# Patient Record
Sex: FEMALE | Race: WHITE | Employment: OTHER | URBAN - METROPOLITAN AREA
[De-identification: names, ages, dates, MRNs, and addresses within clinical notes are randomized per-mention and may not be internally consistent; named-entity substitution may affect disease eponyms.]

---

## 2019-04-02 ENCOUNTER — TELEPHONE (OUTPATIENT)
Dept: PULMONOLOGY | Age: 64
End: 2019-04-02

## 2019-04-02 ENCOUNTER — OFFICE VISIT (OUTPATIENT)
Dept: PULMONOLOGY | Age: 64
End: 2019-04-02

## 2019-04-02 VITALS
BODY MASS INDEX: 30.39 KG/M2 | OXYGEN SATURATION: 97 % | SYSTOLIC BLOOD PRESSURE: 98 MMHG | TEMPERATURE: 97.6 F | HEART RATE: 101 BPM | WEIGHT: 178 LBS | RESPIRATION RATE: 20 BRPM | DIASTOLIC BLOOD PRESSURE: 66 MMHG | HEIGHT: 64 IN

## 2019-04-02 DIAGNOSIS — R05.9 COUGH: Primary | ICD-10-CM

## 2019-04-02 PROBLEM — J98.8 CONGESTION OF RESPIRATORY TRACT: Status: ACTIVE | Noted: 2019-04-02

## 2019-04-02 NOTE — TELEPHONE ENCOUNTER
Pt stated she has been having an ongoing cough for 4wks. States she wants to know if the spot Dr. Conley Nageotte found on her vocal cord could be causing the cough. States she coughs when she talks, is very hoarse & SOB even w/ antibiotics. Please advise [phone].

## 2019-04-02 NOTE — TELEPHONE ENCOUNTER
Pt with cough x 4 weeks. Has been tx with antibiotic by PCP about 4-6 weeks ago. Pt has not been seen her in almost 3 yrs. Has appt for next week with Dr. Jm Peralta but wants to be seen asap. Appt given today to evaluate ongoing cough.

## 2019-04-02 NOTE — PATIENT INSTRUCTIONS
Guaifenesin with dextromethorphan (DM) as directed Please call if you do not hear from gastroenterology

## 2019-04-02 NOTE — PROGRESS NOTES
[facility]  Pulmonary, Critical Care, and Sleep Medicine      Chief complaint:  Cough    HPI:    Elif Nguyen    is 70years old returns the office today for follow-up after several years complaining of a cough for the last 3-1/2 months. She says the cough is dry and is worse at night when she lays down. She sleeps with the head of her bed elevated by using a wedge pillow and other pillows. She also says she does not eat before going to bed and takes Protonix. She also notes that she is hoarse. She also relates episodic dyspnea on exertion but denies leg swelling. Sometimes she has substernal pain which is made worse by taking a deep breath. Allergies   Allergen Reactions    Sulfa (Sulfonamide Antibiotics) Unknown (comments)    Cymbalta [Duloxetine] Unknown (comments)    Gabapentin Unknown (comments)    Reserpine Unknown (comments)    Skelaxin [Metaxalone] Unknown (comments)    Zoloft [Sertraline] Unknown (comments)     Current Outpatient Medications   Medication Sig    levothyroxine (SYNTHROID) 50 mcg tablet Take  by mouth Daily (before breakfast).  FLUOXETINE HCL (FLUOXETINE PO) Take  by mouth.  traMADol (ULTRAM) 50 mg tablet Take 50 mg by mouth every six (6) hours as needed for Pain.  busPIRone (BUSPAR) 15 mg tablet Take 15 mg by mouth three (3) times daily.  fluticasone (FLONASE) 50 mcg/actuation nasal spray 2 Sprays by Both Nostrils route once.  bisacodyl (DULCOLAX) 5 mg EC tablet Take 5 mg by mouth daily as needed for Constipation.  meclizine (ANTIVERT) 25 mg tablet Take  by mouth three (3) times daily as needed.  magnesium oxide 250 mg tablet Take 250 mg by mouth daily.  topiramate (TOPAMAX) 25 mg tablet Take  by mouth two (2) times a day.  montelukast (SINGULAIR) 10 mg tablet Take 10 mg by mouth daily.  acetaminophen (TYLENOL) 500 mg tablet Take  by mouth every six (6) hours as needed for Pain.     albuterol-ipratropium (DUO-NEB) 2.5 mg-0.5 mg/3 ml nebu 3 mL by Nebulization route.  pantoprazole (PROTONIX) 40 mg tablet Take 40 mg by mouth daily. No current facility-administered medications for this visit.       Past Medical History:   Diagnosis Date    Allergic rhinitis     Anxiety     Arthritis     Asthma     Bilateral knee pain     Chronic lung disease     Chronic obstructive airway disease (HCC)     Constipation     COPD (chronic obstructive pulmonary disease) (HCC)     Depression     Diverticular disease     Fatigue     Hypothyroidism     Left rotator cuff tear     Migraines     Osteoarthritis     Sinusitis     SOB (shortness of breath)     Vertigo      Past Surgical History:   Procedure Laterality Date    HX APPENDECTOMY      HX CARPAL TUNNEL RELEASE Bilateral     HX CERVICAL FUSION      HX COLONOSCOPY      HX TOTAL ABDOMINAL HYSTERECTOMY       Social History     Socioeconomic History    Marital status:      Spouse name: Not on file    Number of children: Not on file    Years of education: Not on file    Highest education level: Not on file   Occupational History    Not on file   Social Needs    Financial resource strain: Not on file    Food insecurity:     Worry: Not on file     Inability: Not on file    Transportation needs:     Medical: Not on file     Non-medical: Not on file   Tobacco Use    Smoking status: Never Smoker    Smokeless tobacco: Never Used   Substance and Sexual Activity    Alcohol use: Never     Alcohol/week: 0.0 oz     Frequency: Never    Drug use: Never    Sexual activity: Not on file   Lifestyle    Physical activity:     Days per week: Not on file     Minutes per session: Not on file    Stress: Not on file   Relationships    Social connections:     Talks on phone: Not on file     Gets together: Not on file     Attends Zoroastrianism service: Not on file     Active member of club or organization: Not on file     Attends meetings of clubs or organizations: Not on file Relationship status: Not on file    Intimate partner violence:     Fear of current or ex partner: Not on file     Emotionally abused: Not on file     Physically abused: Not on file     Forced sexual activity: Not on file   Other Topics Concern    Not on file   Social History Narrative    Not on file     Family History   Problem Relation Age of Onset    Cancer Mother     Lung Disease Mother     Arthritis-osteo Mother     Cancer Father     Lung Disease Father     Diabetes Father     Hypertension Father     Heart Disease Father     Arthritis-osteo Father        Review of systems:  She denies fever chills poor appetite or weight loss    Physical Exam:  Visit Vitals  BP 98/66 (BP 1 Location: Left arm, BP Patient Position: Sitting)   Pulse (!) 101   Temp 97.6 °F (36.4 °C) (Oral)   Resp 20   Ht 5' 4\" (1.626 m)   Wt 80.7 kg (178 lb)   SpO2 97%   BMI 30.55 kg/m²       Well-developed well-nourished  HEENT: WNL  Lymph node exam: Supraclavicular cervical lymph nodes negative  Chest: Equal symmetrical expansion no dullness no wheezes rales rubs  Heart: Regular rhythm no gallop no murmur no edema  Extremities: No cyanosis clubbing or calf tenderness  Neurological: Alert and oriented    Labs:    O2 sat 97% room air  Chest x-ray 4/2/19: Personally reviewed, normal lung parenchyma and normal cardiac silhouette appearance no other abnormalities noted    Impression:     Cough in a patient with no fever purulent mucus production or any mucus production making infection unlikely and a history of   Normal spirometry several years ago and no wheezing making asthma unlikely etiology.   Also normal chest x-ray because she has gastroesophageal reflux this is still a consideration especially in a patient who is been hoarse for several months   Plan:   Evaluation with gastroenterology concerning reflux causing cough  As needed guaifenesin with DM  Follow-up in 4-6 weeks    Levon Lowe MD , [facility]    CC: Giancarlo Marie NP [address] [facility], [address] [address]     P: [phone]     F: [phone]

## 2019-04-02 NOTE — PROGRESS NOTES
Chief Complaint   Patient presents with    Breathing Problem    Cough     1. Have you been to the ER, urgent care clinic since your last visit? Hospitalized since your last visit? No    2. Have you seen or consulted any other health care providers outside of the [address] [place] [address] since your last visit? Include any pap smears or colon screening. Yes Where: Dr Alexandra Zelaya PCP, Dr Kerry Persaud Pyschology, Rheumatology    [facility]  [address] [facility]. [address], [address]  [facility]  () [phone]      Simple walk test done in office today. Qualifying O2 sats:     O2 Sat at rest on room air is: 98 %, Pulse 104, SOB scale 7    Walked: 29   m on Room Air : 97 %, Pulse 115, SOB scale 7    O2 Sat at rest on room air O2 is : 97 %, Pulse 109, SOB scale 7    Tech comments regarding testing: patient unable to complete simple walk test. Patient c/o moderate to severe shortness of breath 7/10, severe cough attacks and difficulty walking d/t leg pain. Patient's oxygen remained at 97%.

## 2019-04-08 ENCOUNTER — TELEPHONE (OUTPATIENT)
Dept: PULMONOLOGY | Age: 64
End: 2019-04-08

## 2019-04-08 NOTE — TELEPHONE ENCOUNTER
Pt calling to see if there is any difference in her taking the liquid Mucinex vs pills. Liquid cheaper per pt.  Advised to pt either is fine

## 2019-04-08 NOTE — TELEPHONE ENCOUNTER
Pt stated Dr. Aylin Montes advised at last visit to take mucinex dm for cough and hoarseness. States she's been taking the pill version for 1wk and feels better, but not completely. Wanting to know if there's a difference between pill & liquid and how long he advises her to continue taking medication. Please advise [phone].

## 2019-05-21 ENCOUNTER — OFFICE VISIT (OUTPATIENT)
Dept: PULMONOLOGY | Age: 64
End: 2019-05-21

## 2019-05-21 VITALS
WEIGHT: 175 LBS | HEIGHT: 64 IN | HEART RATE: 85 BPM | BODY MASS INDEX: 29.88 KG/M2 | OXYGEN SATURATION: 98 % | SYSTOLIC BLOOD PRESSURE: 110 MMHG | RESPIRATION RATE: 20 BRPM | TEMPERATURE: 97.6 F | DIASTOLIC BLOOD PRESSURE: 70 MMHG

## 2019-05-21 DIAGNOSIS — R05.9 COUGH: Primary | ICD-10-CM

## 2019-05-21 NOTE — PROGRESS NOTES
Elif Nguyen presents today for   Chief Complaint   Patient presents with    Breathing Problem    COPD    Cough    Shortness of Breath       Is someone accompanying this pt? No     Is the patient using any DME equipment during OV? No     -DME Company n/a    Depression Screening:  3 most recent PHQ Screens 5/21/2019   Little interest or pleasure in doing things Not at all   Feeling down, depressed, irritable, or hopeless Not at all   Total Score PHQ 2 0       Learning Assessment:  Learning Assessment 6/3/2016   PRIMARY LEARNER Patient   PRIMARY LANGUAGE ENGLISH   LEARNER PREFERENCE PRIMARY DEMONSTRATION     LISTENING     READING     VIDEOS     PICTURES   ANSWERED BY Patient   RELATIONSHIP SELF       Abuse Screening:  No flowsheet data found. Fall Risk  Fall Risk Assessment, last 12 mths 5/21/2019   Able to walk? Yes   Fall in past 12 months? Yes   Fall with injury? No   Number of falls in past 12 months 5   Fall Risk Score 5         Coordination of Care:  1. Have you been to the ER, urgent care clinic since your last visit? Hospitalized since your last visit? Yes; Name: [facility]    2. Have you seen or consulted any other health care providers outside of the [address] [place] [address] since your last visit? Include any pap smears or colon screening.  Colonoscopy Dr John Castillo

## 2019-05-21 NOTE — PATIENT INSTRUCTIONS
Continue sleeping with the head of your bed elevated no eating for 2 to 3 hours before bedtime. If this is not helpful consider further evaluation with gastro enterology to prevent aspiration from reflux

## 2019-05-21 NOTE — PROGRESS NOTES
[facility]  Pulmonary, Critical Care, and Sleep Medicine      Chief complaint:  Cough    HPI:    Otto Santillan    is 70years old and comes to the office today because of a chronic cough relates that she also has hoarseness of time that the mucus is frequently clear or cough is nonproductive. Also reports chest pain and a feeling that she cannot breathe when she is eating. She also has dyspnea on exertion at times      Allergies   Allergen Reactions    Sulfa (Sulfonamide Antibiotics) Unknown (comments)    Cymbalta [Duloxetine] Unknown (comments)    Gabapentin Unknown (comments)    Reserpine Unknown (comments)    Skelaxin [Metaxalone] Unknown (comments)    Zoloft [Sertraline] Unknown (comments)     Current Outpatient Medications   Medication Sig    levothyroxine (SYNTHROID) 50 mcg tablet Take  by mouth Daily (before breakfast).  pantoprazole (PROTONIX) 40 mg tablet Take 40 mg by mouth daily.  FLUOXETINE HCL (FLUOXETINE PO) Take  by mouth.  traMADol (ULTRAM) 50 mg tablet Take 50 mg by mouth every six (6) hours as needed for Pain.  busPIRone (BUSPAR) 15 mg tablet Take 15 mg by mouth three (3) times daily.  fluticasone (FLONASE) 50 mcg/actuation nasal spray 2 Sprays by Both Nostrils route once.  bisacodyl (DULCOLAX) 5 mg EC tablet Take 5 mg by mouth daily as needed for Constipation.  meclizine (ANTIVERT) 25 mg tablet Take  by mouth three (3) times daily as needed.  magnesium oxide 250 mg tablet Take 250 mg by mouth daily.  topiramate (TOPAMAX) 25 mg tablet Take  by mouth two (2) times a day.  montelukast (SINGULAIR) 10 mg tablet Take 10 mg by mouth daily.  acetaminophen (TYLENOL) 500 mg tablet Take  by mouth every six (6) hours as needed for Pain.  albuterol-ipratropium (DUO-NEB) 2.5 mg-0.5 mg/3 ml nebu 3 mL by Nebulization route. No current facility-administered medications for this visit.       Past Medical History:   Diagnosis Date    Allergic rhinitis     Anxiety     Arthritis     Asthma     Bilateral knee pain     Chronic lung disease     Chronic obstructive airway disease (HCC)     Constipation     COPD (chronic obstructive pulmonary disease) (HCC)     Depression     Diverticular disease     Fatigue     GERD (gastroesophageal reflux disease)     Hypothyroidism     Left rotator cuff tear     Migraines     Osteoarthritis     Sinusitis     SOB (shortness of breath)     Vertigo      Past Surgical History:   Procedure Laterality Date    HX APPENDECTOMY      HX CARPAL TUNNEL RELEASE Bilateral     HX CERVICAL FUSION      HX COLONOSCOPY      HX COLONOSCOPY      HX TOTAL ABDOMINAL HYSTERECTOMY       Social History     Socioeconomic History    Marital status:      Spouse name: Not on file    Number of children: Not on file    Years of education: Not on file    Highest education level: Not on file   Occupational History    Not on file   Social Needs    Financial resource strain: Not on file    Food insecurity:     Worry: Not on file     Inability: Not on file    Transportation needs:     Medical: Not on file     Non-medical: Not on file   Tobacco Use    Smoking status: Never Smoker    Smokeless tobacco: Never Used   Substance and Sexual Activity    Alcohol use: Never     Alcohol/week: 0.0 oz     Frequency: Never    Drug use: Never    Sexual activity: Not on file   Lifestyle    Physical activity:     Days per week: Not on file     Minutes per session: Not on file    Stress: Not on file   Relationships    Social connections:     Talks on phone: Not on file     Gets together: Not on file     Attends Nondenominational service: Not on file     Active member of club or organization: Not on file     Attends meetings of clubs or organizations: Not on file     Relationship status: Not on file    Intimate partner violence:     Fear of current or ex partner: Not on file     Emotionally abused: Not on file     Physically abused: Not on file     Forced sexual activity: Not on file   Other Topics Concern    Not on file   Social History Narrative    Not on file     Family History   Problem Relation Age of Onset    Cancer Mother     Lung Disease Mother     Arthritis-osteo Mother     Cancer Father     Lung Disease Father     Diabetes Father     Hypertension Father     Heart Disease Father     Arthritis-osteo Father        Review of systems:  She denies fever chills despite having these feelings with eating being suffocated she is gaining weight she reports    Physical Exam:  Visit Vitals  /70 (BP 1 Location: Left arm, BP Patient Position: Sitting)   Pulse 85   Temp 97.6 °F (36.4 °C) (Oral)   Resp 20   Ht 5' 4\" (1.626 m)   Wt 79.4 kg (175 lb)   SpO2 98%   BMI 30.04 kg/m²       Well-developed well-nourished  HEENT: WNL  Lymph node exam: Supraclavicular cervical lymph nodes negative  Chest: Symmetrical expansion no dullness no wheezes rales   heart: Regular rhythm no gallop no murmur no JVD no peripheral  Extremities: No cyanosis or clubbing  Neurological: Alert and oriented  Labs:    O2 sat room air at rest 98%    Impression:     Chronic cough in a patient with a normal chest x-ray spirometry and some unusual symptoms such as smothering when she eats but no dysphasia. She is seeing gastroenterology who feels she is having reflux symptoms    Plan:     Continue efforts to prevent reflux follow-up with gastroenterology follow-up in 6 months otherwise    Charma Prader, MD , [facility]    CC: Kali Li NP     [address] [facility], [address]     P: [phone]     F: [phone]

## 2021-07-29 ENCOUNTER — TRANSCRIBE ORDER (OUTPATIENT)
Dept: SCHEDULING | Age: 66
End: 2021-07-29

## 2021-07-29 DIAGNOSIS — R51.9 NEW ONSET OF HEADACHES: Primary | ICD-10-CM

## 2021-08-11 ENCOUNTER — HOSPITAL ENCOUNTER (OUTPATIENT)
Dept: CT IMAGING | Age: 66
Discharge: HOME | End: 2021-08-11
Payer: MEDICARE

## 2021-08-11 ENCOUNTER — TRANSCRIBE ORDER (OUTPATIENT)
Dept: REGISTRATION | Age: 66
End: 2021-08-11

## 2021-08-11 ENCOUNTER — HOSPITAL ENCOUNTER (OUTPATIENT)
Dept: LAB | Age: 66
Discharge: HOME | End: 2021-08-11
Payer: MEDICARE

## 2021-08-11 DIAGNOSIS — R51.9 HEAD ACHE: ICD-10-CM

## 2021-08-11 DIAGNOSIS — R51.9 HEAD ACHE: Primary | ICD-10-CM

## 2021-08-11 DIAGNOSIS — R51.9 NEW ONSET OF HEADACHES: ICD-10-CM

## 2021-08-11 PROCEDURE — 70470 CT HEAD/BRAIN W/O & W/DYE: CPT

## 2021-08-11 PROCEDURE — 74011000636 HC RX REV CODE- 636

## 2021-08-11 RX ORDER — SODIUM CHLORIDE 0.9 % (FLUSH) 0.9 %
5-10 SYRINGE (ML) INJECTION
Status: COMPLETED | OUTPATIENT
Start: 2021-08-11 | End: 2021-08-11

## 2021-08-11 RX ADMIN — IOPAMIDOL 100 ML: 755 INJECTION, SOLUTION INTRAVENOUS at 10:50

## 2021-08-11 RX ADMIN — Medication 10 ML: at 10:46

## 2022-03-19 PROBLEM — R05.9 COUGH: Status: ACTIVE | Noted: 2019-04-02

## 2022-03-20 PROBLEM — J98.8 CONGESTION OF RESPIRATORY TRACT: Status: ACTIVE | Noted: 2019-04-02

## 2022-06-24 ENCOUNTER — OFFICE VISIT (OUTPATIENT)
Dept: ORTHOPEDIC SURGERY | Age: 67
End: 2022-06-24
Payer: MEDICARE

## 2022-06-24 VITALS — BODY MASS INDEX: 29.23 KG/M2 | HEIGHT: 63 IN | WEIGHT: 165 LBS

## 2022-06-24 DIAGNOSIS — M25.521 RIGHT ELBOW PAIN: Primary | ICD-10-CM

## 2022-06-24 DIAGNOSIS — M77.11 RIGHT LATERAL EPICONDYLITIS: ICD-10-CM

## 2022-06-24 PROCEDURE — 20606 DRAIN/INJ JOINT/BURSA W/US: CPT | Performed by: ORTHOPAEDIC SURGERY

## 2022-06-24 PROCEDURE — 1090F PRES/ABSN URINE INCON ASSESS: CPT | Performed by: ORTHOPAEDIC SURGERY

## 2022-06-24 PROCEDURE — G8427 DOCREV CUR MEDS BY ELIG CLIN: HCPCS | Performed by: ORTHOPAEDIC SURGERY

## 2022-06-24 PROCEDURE — 96372 THER/PROPH/DIAG INJ SC/IM: CPT | Performed by: ORTHOPAEDIC SURGERY

## 2022-06-24 PROCEDURE — 3017F COLORECTAL CA SCREEN DOC REV: CPT | Performed by: ORTHOPAEDIC SURGERY

## 2022-06-24 PROCEDURE — 1123F ACP DISCUSS/DSCN MKR DOCD: CPT | Performed by: ORTHOPAEDIC SURGERY

## 2022-06-24 PROCEDURE — G8536 NO DOC ELDER MAL SCRN: HCPCS | Performed by: ORTHOPAEDIC SURGERY

## 2022-06-24 PROCEDURE — 1101F PT FALLS ASSESS-DOCD LE1/YR: CPT | Performed by: ORTHOPAEDIC SURGERY

## 2022-06-24 PROCEDURE — G8400 PT W/DXA NO RESULTS DOC: HCPCS | Performed by: ORTHOPAEDIC SURGERY

## 2022-06-24 PROCEDURE — G8432 DEP SCR NOT DOC, RNG: HCPCS | Performed by: ORTHOPAEDIC SURGERY

## 2022-06-24 PROCEDURE — 99203 OFFICE O/P NEW LOW 30 MIN: CPT | Performed by: ORTHOPAEDIC SURGERY

## 2022-06-24 PROCEDURE — G8417 CALC BMI ABV UP PARAM F/U: HCPCS | Performed by: ORTHOPAEDIC SURGERY

## 2022-06-24 RX ORDER — LIDOCAINE HYDROCHLORIDE 10 MG/ML
1 INJECTION INFILTRATION; PERINEURAL ONCE
Status: COMPLETED | OUTPATIENT
Start: 2022-06-24 | End: 2022-06-24

## 2022-06-24 RX ORDER — TRIAMCINOLONE ACETONIDE 40 MG/ML
40 INJECTION, SUSPENSION INTRA-ARTICULAR; INTRAMUSCULAR ONCE
Status: COMPLETED | OUTPATIENT
Start: 2022-06-24 | End: 2022-06-24

## 2022-06-24 RX ORDER — ALPRAZOLAM 0.25 MG/1
TABLET ORAL
COMMUNITY
Start: 2022-06-08

## 2022-06-24 RX ORDER — EZETIMIBE 10 MG/1
TABLET ORAL
COMMUNITY
Start: 2022-05-13

## 2022-06-24 RX ADMIN — TRIAMCINOLONE ACETONIDE 40 MG: 40 INJECTION, SUSPENSION INTRA-ARTICULAR; INTRAMUSCULAR at 16:00

## 2022-06-24 RX ADMIN — LIDOCAINE HYDROCHLORIDE 1 ML: 10 INJECTION INFILTRATION; PERINEURAL at 16:00

## 2022-06-24 NOTE — LETTER
Chandrika Singeri   1955   [id number]       6/24/2022       I hereby authorize and direct Jaden Fernandez MD, Tucker Betancourt, and whomever he may designate as his associate to perform upon myself the following procedure:    Injection of: Kenalog, rt elbow/aspirate    If any unforeseen condition arises in the course of the procedure, I further authorize him and his associated and/or assistant(s) to do whatever he/she deems advisable. The nature, purpose, benefits, risks, side effects, likelihood of achieving goals, and potential problems that might occur during recuperation, risks for not receiving the proposed care, treatment and services and alternatives of the procedure have been fully explained to me by my physician including, but not limited to:    Swelling, joint pain, skin pigment changes, worsening of condition, and failure to improve. I acknowledge that no guarantee or assurance has been made to me as to the results that may be obtained or the likelihood of success.                 _______________________________________     Signature of patient or authorized representative                [employer] [facility] fax: [phone]

## 2022-06-24 NOTE — LETTER
6/27/2022    Patient: Jeffrey Moss   YOB: 1955   Date of Visit: 6/24/2022     Brook Adhikari NP  [facility] [address]  Via Fax: [phone]    Dear Brook Adhikari NP,      Thank you for referring Ms. Chandrika Flannery to [address] [place] [address] for evaluation. My notes for this consultation are attached. If you have questions, please do not hesitate to call me. I look forward to following your patient along with you.       Sincerely,    Joanna Bosch MD

## 2022-06-24 NOTE — PROGRESS NOTES
Name: Jeffrey Moss    : 1955     Service Dept: [address] [place] [address]    Chief Complaint   Patient presents with    Elbow Pain        Visit Vitals  Ht 5' 3\" (1.6 m)   Wt 165 lb (74.8 kg)   BMI 29.23 kg/m²        Allergies   Allergen Reactions    Sulfa (Sulfonamide Antibiotics) Unknown (comments)    Cymbalta [Duloxetine] Unknown (comments)    Gabapentin Unknown (comments)    Reserpine Unknown (comments)    Skelaxin [Metaxalone] Unknown (comments)    Zoloft [Sertraline] Unknown (comments)        Current Outpatient Medications   Medication Sig Dispense Refill    ALPRAZolam (XANAX) 0.25 mg tablet       ezetimibe (ZETIA) 10 mg tablet       levothyroxine (SYNTHROID) 50 mcg tablet Take  by mouth Daily (before breakfast).  pantoprazole (PROTONIX) 40 mg tablet Take 40 mg by mouth daily.  FLUOXETINE HCL (FLUOXETINE PO) Take  by mouth.  traMADol (ULTRAM) 50 mg tablet Take 50 mg by mouth every six (6) hours as needed for Pain.  busPIRone (BUSPAR) 15 mg tablet Take 15 mg by mouth three (3) times daily.  fluticasone (FLONASE) 50 mcg/actuation nasal spray 2 Sprays by Both Nostrils route once.  bisacodyl (DULCOLAX) 5 mg EC tablet Take 5 mg by mouth daily as needed for Constipation.  meclizine (ANTIVERT) 25 mg tablet Take  by mouth three (3) times daily as needed.  magnesium oxide 250 mg tablet Take 250 mg by mouth daily.  topiramate (TOPAMAX) 25 mg tablet Take  by mouth two (2) times a day.  montelukast (SINGULAIR) 10 mg tablet Take 10 mg by mouth daily.  acetaminophen (TYLENOL) 500 mg tablet Take  by mouth every six (6) hours as needed for Pain.  albuterol-ipratropium (DUO-NEB) 2.5 mg-0.5 mg/3 ml nebu 3 mL by Nebulization route.        Current Facility-Administered Medications   Medication Dose Route Frequency Provider Last Rate Last Admin    [COMPLETED] triamcinolone acetonide (KENALOG-40) 40 mg/mL injection 40 mg  40 mg Other ONCE Jaden Power MD   40 mg at 06/24/22 1600    [COMPLETED] lidocaine (XYLOCAINE) 10 mg/mL (1 %) injection 1 mL  1 mL Other ONCE Jaden Del Toro MD   1 mL at 06/24/22 1600      Patient Active Problem List   Diagnosis Code    Cough R05.9    Congestion of respiratory tract J98.8      Family History   Problem Relation Age of Onset    Cancer Mother     Lung Disease Mother     OSTEOARTHRITIS Mother     Cancer Father     Lung Disease Father     Diabetes Father     Hypertension Father     Heart Disease Father     OSTEOARTHRITIS Father       Social History     Socioeconomic History    Marital status:    Tobacco Use    Smoking status: Never Smoker    Smokeless tobacco: Never Used   Substance and Sexual Activity    Alcohol use: Never     Alcohol/week: 0.0 standard drinks    Drug use: Never      Past Surgical History:   Procedure Laterality Date    HX APPENDECTOMY      HX CARPAL TUNNEL RELEASE Bilateral     HX CERVICAL FUSION      HX COLONOSCOPY      HX COLONOSCOPY      HX TOTAL ABDOMINAL HYSTERECTOMY        Past Medical History:   Diagnosis Date    Allergic rhinitis     Anxiety     Arthritis     Asthma     Bilateral knee pain     Chronic lung disease     Chronic obstructive airway disease (HCC)     Constipation     COPD (chronic obstructive pulmonary disease) (HCC)     Depression     Diverticular disease     Fatigue     GERD (gastroesophageal reflux disease)     Hypothyroidism     Left rotator cuff tear     Migraines     Osteoarthritis     Sinusitis     SOB (shortness of breath)     Vertigo         I have reviewed and agree with PFSH and ROS and intake form in chart and the record furthermore I have reviewed prior medical record(s) regarding this patients care during this appointment.      Review of Systems:   Patient is a pleasant appearing individual, appropriately dressed, well hydrated, well nourished, who is alert, appropriately oriented for age, and in no acute distress with a normal gait and normal affect who does not appear to be in any significant pain. Physical Exam:  Left Elbow- Full Range of motion, Grossly neurovascularly intact, No instabililty, Positive extension test with weakness, Mild swelling, Point tenderness on the lateral epicondyle, No crepitation, No skin rashes or lesions identified. Right Elbow- Full Range of motion, No point tenderness, No instability, Normal Strength, No skin lesions, No swelling, Grossly neurovascularly intact. Procedure Documentation:    I discussed in detail the risks, benefits and complications of an injection which included but are not limited to infection, skin reactions, hot swollen joint, and anaphylaxis with the patient. The patient verbalized understanding and gave informed consent for the injection. The patient's left elbow was prepped using sterile alcohol solution. A sterile needle was inserted into the left elbow and the mixture of 1 mL Lidocaine 1%, 1 mL Kenalog 40 mg was injected under sterile technique. The needle was withdrawn and the puncture site sealed with a Band-Aid. Technique: Under sterile conditions a Neuro Hero ultrasound unit with a variable frequency (7.0-14.0 MHz) linear transducer was used to localize the placement of needle into the left elbow joint. Findings: Successful needle placement for elbow injection. Final images were taken and saved for permanent record. The patient tolerated the injection well. The patient was instructed to call the office immediately if there is any pain, redness, warmth, fever, or chills. Encounter Diagnoses     ICD-10-CM ICD-9-CM   1. Right elbow pain  M25.521 719.42   2. Right lateral epicondylitis  M77.11 726.32       HPI:  The patient is here with a chief complaint of right elbow pain, throbbing, burning pain. It has been the same. Pain is 6/10. X-rays of the right elbow are unremarkable. Assessment/Plan:  1.   Right elbow olecranon bursitis. Plan will be for aspiration and injection to the right elbow. We will see the patient back in 4 weeks and go from there. If the patient gets worse, she is to give me a call. As part of continued conservative pain management options the patient was advised to utilize Tylenol or OTC NSAIDS as long as it is not medically contraindicated. Return to Office: Follow-up and Dispositions    · Return in about 4 weeks (around 7/22/2022) for toya. Administrations This Visit     lidocaine (XYLOCAINE) 10 mg/mL (1 %) injection 1 mL     Admin Date  06/24/2022 Action  Given Dose  1 mL Route  Other Administered By  Deonte Ayala LPN          triamcinolone acetonide (KENALOG-40) 40 mg/mL injection 40 mg     Admin Date  06/24/2022 Action  Given Dose  40 mg Route  Other Administered By  Deonte Ayala LPN               Scribed by Hayden Hutson LPN as dictated by [facility] ZAYRA Alvarez MD.  Documentation True and Accepted Jaden ZAYRA Alvarez MD

## 2022-06-24 NOTE — PATIENT INSTRUCTIONS
Tennis Elbow: Care Instructions  Overview     Tennis elbow is soreness or pain on the outer part of the elbow. The pain occurs when the tendon is stretched and becomes irritated by repeated twisting of the hand, wrist, and forearm. A tendon is a tough tissue that connects muscle to bone. This injury is common in tennis players. But you also can get it from many activities that work the same muscles. Examples include gardening, painting, and using tools. Tennis elbow usually heals with rest and treatment at home. Follow-up care is a key part of your treatment and safety. Be sure to make and go to all appointments, and call your doctor if you are having problems. It's also a good idea to know your test results and keep a list of the medicines you take. How can you care for yourself at home?    · Rest your fingers, wrist, and forearm. Try to stop or reduce any activity that causes elbow pain. You may have to rest your arm for weeks to months. Follow your doctor's directions for how long to rest.     · Put ice or a cold pack on your elbow for 10 to 20 minutes at a time. Try to do this every 1 to 2 hours for the next 3 days (when you are awake) or until the swelling goes down. Put a thin cloth between the ice and your skin. You can try heat, or alternating heat and ice, after the first 3 days.     · If your doctor gave you a brace or splint, use it as directed. A \"counterforce\" brace is a strap around your forearm, just below your elbow. It may ease the pressure on the tendon and spread force throughout your arm.     · Prop up your elbow on pillows to help reduce swelling.     · Follow your doctor's or physical therapist's directions for exercise.     · Return to your usual activities slowly.     · Try to prevent the problem. Learn the best techniques for your sport. For example, make sure the  on your tennis racquet is not too big for your hand.  Try not to hit a tennis ball late in your swing.     · If you work, consider asking your employer about new ways of doing your job if your elbow pain is caused by something you do at work. Medicines    · Be safe with medicines. Read and follow all instructions on the label. ? If the doctor gave you a prescription medicine for pain, take it as prescribed. ? If you are not taking a prescription pain medicine, ask your doctor if you can take an over-the-counter medicine. When should you call for help? Call your doctor now or seek immediate medical care if:    · Your pain is worse.     · You cannot bend your elbow normally.     · Your arm or hand is cool or pale or changes color.     · You have tingling, weakness, or numbness in your hand and fingers. Watch closely for changes in your health, and be sure to contact your doctor if:    · You have work problems caused by your elbow pain.     · Your pain is not better after 2 weeks. Where can you learn more? Go to http://www.gray.com/  Enter C285 in the search box to learn more about \"Tennis Elbow: Care Instructions. \"  Current as of: July 1, 2021               Content Version: 13.2  © 3277-0736 [employer]. Care instructions adapted under license by [employer] (which disclaims liability or warranty for this information). If you have questions about a medical condition or this instruction, always ask your healthcare professional. [address] any warranty or liability for your use of this information.

## 2022-06-27 RX ORDER — OMEPRAZOLE 40 MG/1
CAPSULE, DELAYED RELEASE ORAL
COMMUNITY
Start: 2022-06-26

## 2022-09-12 ENCOUNTER — TELEPHONE (OUTPATIENT)
Dept: GASTROENTEROLOGY | Age: 67
End: 2022-09-12

## 2022-09-12 NOTE — TELEPHONE ENCOUNTER
Patient called asking about referral. I informed her that Dr. Cody Green has declined her referral due to her issue being more surgical related. I advised her to contact her PCP to see if she has any suggestions for sending another referral somewhere else.

## 2022-10-06 ENCOUNTER — OFFICE VISIT (OUTPATIENT)
Dept: GASTROENTEROLOGY | Age: 67
End: 2022-10-06
Payer: MEDICARE

## 2022-10-06 VITALS
SYSTOLIC BLOOD PRESSURE: 129 MMHG | DIASTOLIC BLOOD PRESSURE: 78 MMHG | WEIGHT: 164 LBS | BODY MASS INDEX: 29.05 KG/M2 | HEART RATE: 67 BPM

## 2022-10-06 DIAGNOSIS — M25.78 DEGENERATION OF INTERVERTEBRAL DISC OF CERVICAL REGION WITH OSTEOPHYTE OF CERVICAL VERTEBRA: ICD-10-CM

## 2022-10-06 DIAGNOSIS — F41.9 ANXIETY: ICD-10-CM

## 2022-10-06 DIAGNOSIS — M50.30 DEGENERATION OF INTERVERTEBRAL DISC OF CERVICAL REGION WITH OSTEOPHYTE OF CERVICAL VERTEBRA: ICD-10-CM

## 2022-10-06 DIAGNOSIS — R13.13 PHARYNGEAL DYSPHAGIA: Primary | ICD-10-CM

## 2022-10-06 DIAGNOSIS — F32.A DEPRESSION, UNSPECIFIED DEPRESSION TYPE: ICD-10-CM

## 2022-10-06 DIAGNOSIS — K21.00 GASTROESOPHAGEAL REFLUX DISEASE WITH ESOPHAGITIS WITHOUT HEMORRHAGE: ICD-10-CM

## 2022-10-06 DIAGNOSIS — Z86.010 HISTORY OF COLON POLYPS: ICD-10-CM

## 2022-10-06 PROCEDURE — 3017F COLORECTAL CA SCREEN DOC REV: CPT | Performed by: INTERNAL MEDICINE

## 2022-10-06 PROCEDURE — 1090F PRES/ABSN URINE INCON ASSESS: CPT | Performed by: INTERNAL MEDICINE

## 2022-10-06 PROCEDURE — G8417 CALC BMI ABV UP PARAM F/U: HCPCS | Performed by: INTERNAL MEDICINE

## 2022-10-06 PROCEDURE — G8400 PT W/DXA NO RESULTS DOC: HCPCS | Performed by: INTERNAL MEDICINE

## 2022-10-06 PROCEDURE — G8536 NO DOC ELDER MAL SCRN: HCPCS | Performed by: INTERNAL MEDICINE

## 2022-10-06 PROCEDURE — 99204 OFFICE O/P NEW MOD 45 MIN: CPT | Performed by: INTERNAL MEDICINE

## 2022-10-06 PROCEDURE — G8427 DOCREV CUR MEDS BY ELIG CLIN: HCPCS | Performed by: INTERNAL MEDICINE

## 2022-10-06 PROCEDURE — 1101F PT FALLS ASSESS-DOCD LE1/YR: CPT | Performed by: INTERNAL MEDICINE

## 2022-10-06 PROCEDURE — G8510 SCR DEP NEG, NO PLAN REQD: HCPCS | Performed by: INTERNAL MEDICINE

## 2022-10-06 PROCEDURE — 1123F ACP DISCUSS/DSCN MKR DOCD: CPT | Performed by: INTERNAL MEDICINE

## 2022-10-06 NOTE — PROGRESS NOTES
Teo Chairez presents today for   Chief Complaint   Patient presents with    New Patient     When patient turns her neck a certain way, cough or yawn it cuts her throat off and has a weird pain in her left lower abd. Is someone accompanying this pt? no    Is the patient using any DME equipment during [address]? Yes a  cane     Depression Screening:  3 most recent PHQ Screens 10/6/2022   Little interest or pleasure in doing things Not at all   Feeling down, depressed, irritable, or hopeless Not at all   Total Score PHQ 2 0       Learning Assessment:  Learning Assessment 6/3/2016   PRIMARY LEARNER Patient   PRIMARY LANGUAGE ENGLISH   LEARNER PREFERENCE PRIMARY DEMONSTRATION     LISTENING     READING     VIDEOS     PICTURES   ANSWERED BY Patient   RELATIONSHIP SELF       Fall Risk  Fall Risk Assessment, last 12 mths 10/6/2022   Able to walk? Yes   Fall in past 12 months? 0   Do you feel unsteady? 0   Are you worried about falling 0   Number of falls in past 12 months -   Fall with injury? -       Coordination of Care:  1. Have you been to the ER, urgent care clinic since your last visit? Hospitalized since your last visit? no    2. Have you seen or consulted any other health care providers outside of the [address] [place] [address] since your last visit? Include any pap smears or colon screening.  Yes, pcp Elena Mallory

## 2022-10-06 NOTE — PROGRESS NOTES
Referring Physician:  Osmel Alanis NP     Chief Complaint: Esophageal reflux and cough    Date of service: 10/06/22     Subjective:     History of Present Illness:  Patient is a female [address] [facility] 69 y.o.  who is seen for evaluation for esophageal reflux and cough. The history is from the patient and their medical records. The patient has GI complaints of esophageal reflux and cough for at least 11 years. According to her records, she has had various complaints of what she proceeded his reflux. She also complains of cough, clearing of her throat when eating at times, and the feeling as if air is cut off when she turns her head or yawns. She denies any true dysphagia. She has been tried on various PPIs and is presently on omeprazole 40 mg a day with no real improvement in her symptoms. This has been exhaustively evaluated in the past.  12/2011 she had a  barium swallow which revealed the following:Degenerative changes are noted involving the cervical spine with fusion at levels C3-C4.   5/2012 she underwent upper endoscopy which revealed: 1 gastric abrasion but was otherwise normal.  7/2016, moderate barium swallow was done and revealed: Moderate to large anterior cervical osteophytes from C4-C5 through C6-C7 encroaching upon the posterior cervical esophagus. 8/2021, neck x-ray was done and revealed:  FINDINGS:   VERTEBRAE AND ALIGNMENT: C3-C4 ACDF. No evidence of hardware complication/loosening. There appears to be at least partial solid osseous bridging between these vertebral bodies. Straightening of lordosis without listhesis. Vertebral body height is preserved. DISC SPACES/FACETS: Moderate degenerative disc changes in the lower cervical levels with bulky anterior osteophytosis. Mild facet arthropathy. There appears to be some osteophytic foraminal encroachment on the left at C4-5 and C5-6. She also reports neck surgery at least 15 years ago.       The patient denies nausea, vomiting, fever, chills, dysphagia, change in bowel habits, diarrhea, weight loss, rectal bleeding, or melena. Does complain of occasional left-sided abdominal pain. She also says she has a long history of constipation for which she takes Doculax most days. Last EGD -  2011 as above. Last colonoscopy -  she is not sure- history of colon polyps. Family history for GI disease is significant for mother had colon cancer. The patient denies liver related risk factors. Tobacco use - none- COPD from secondary smoke. Alcohol use - none. Past medical history is significant for anxiety, asthma, COPD, constipation, depression, diverticulosis, fatigue, esophageal reflux, hypothyroidism, migraine headaches, osteoarthritis, cervical neck surgery with fusion, total abdominal hysterectomy, and appendectomy. PMH:  Past Medical History:   Diagnosis Date    Allergic rhinitis     Anxiety     Arthritis     Asthma     Bilateral knee pain     Chronic lung disease     Chronic obstructive airway disease (HCC)     Constipation     COPD (chronic obstructive pulmonary disease) (HCC)     Depression     Diverticular disease     Fatigue     GERD (gastroesophageal reflux disease)     Hypothyroidism     Left rotator cuff tear     Migraines     Osteoarthritis     Sinusitis     SOB (shortness of breath)     Vertigo         PSH:  Past Surgical History:   Procedure Laterality Date    HX APPENDECTOMY      HX CARPAL TUNNEL RELEASE Bilateral     HX CERVICAL FUSION      HX COLONOSCOPY      HX COLONOSCOPY      HX TOTAL ABDOMINAL HYSTERECTOMY          Allergies:   Allergies   Allergen Reactions    Sulfa (Sulfonamide Antibiotics) Unknown (comments)    Cymbalta [Duloxetine] Unknown (comments)    Gabapentin Unknown (comments)    Reserpine Unknown (comments)    Skelaxin [Metaxalone] Unknown (comments)    Zoloft [Sertraline] Unknown (comments)        Home Medications:  Cannot display prior to admission medications because the patient has not been admitted in this contact. Hospital Medications:  Current Outpatient Medications   Medication Sig    omeprazole (PRILOSEC) 40 mg capsule     ALPRAZolam (XANAX) 0.25 mg tablet     ezetimibe (ZETIA) 10 mg tablet     levothyroxine (SYNTHROID) 50 mcg tablet Take  by mouth Daily (before breakfast). pantoprazole (PROTONIX) 40 mg tablet Take 40 mg by mouth daily. FLUOXETINE HCL (FLUOXETINE PO) Take  by mouth. traMADol (ULTRAM) 50 mg tablet Take 50 mg by mouth every six (6) hours as needed for Pain. busPIRone (BUSPAR) 15 mg tablet Take 15 mg by mouth three (3) times daily. fluticasone (FLONASE) 50 mcg/actuation nasal spray 2 Sprays by Both Nostrils route once. bisacodyl (DULCOLAX) 5 mg EC tablet Take 5 mg by mouth daily as needed for Constipation. meclizine (ANTIVERT) 25 mg tablet Take  by mouth three (3) times daily as needed. magnesium oxide 250 mg tablet Take 250 mg by mouth daily. topiramate (TOPAMAX) 25 mg tablet Take  by mouth two (2) times a day. montelukast (SINGULAIR) 10 mg tablet Take 10 mg by mouth daily. acetaminophen (TYLENOL) 500 mg tablet Take  by mouth every six (6) hours as needed for Pain. albuterol-ipratropium (DUO-NEB) 2.5 mg-0.5 mg/3 ml nebu 3 mL by Nebulization route. No current facility-administered medications for this visit. Social History:  Social History     Tobacco Use    Smoking status: Never    Smokeless tobacco: Never   Substance Use Topics    Alcohol use: Never     Alcohol/week: 0.0 standard drinks        Pt denies any history of IV drug use, blood transfusions.     Family History:  Family History   Problem Relation Age of Onset    Cancer Mother     Lung Disease Mother     OSTEOARTHRITIS Mother     Cancer Father     Lung Disease Father     Diabetes Father     Hypertension Father     Heart Disease Father     OSTEOARTHRITIS Father         Review of Systems:  A detailed 10 system ROS is obtained, with pertinent positives as listed above. All others are negative unless listed in history above. Constitutional denies fever chills, headache, or weight loss. Skin- denies lesions or rashes. HEENT- denies any vision or hearing problems, epistaxis, sore throat, or dental problems. Lungs- no shortness of breath or chest pain reported, no dyspnea on exertion. Cardiac- no palpitations or chest pain reported, including at rest or on exertion. GI-no abdominal pain, melena, rectal bleeding, reflux, dysphagia, jaundice, change in stool or urine color, constipation or diarrhea. Genitourinary -no dysuria or hematuria. Musculoskeletal-no muscle weakness or disuse or atrophy. Neurologic-no numbness, tingling, gait disturbance, or other abnormalities. Rheumatologic- patient denies any immune or rheumatologic diseases or symptoms. Endocrine- patient denies any endocrine abnormalities including thyroid disease or diabetes. Psychologic-patient denies depression, anxiety or emotional issues. No reported memory issues. Objective:     Physical Exam:  Vitals: /78 (BP 1 Location: Left upper arm, BP Patient Position: Sitting)   Pulse 67   Wt 74.4 kg (164 lb)   BMI 29.05 kg/m²    Gen:  Pt is alert, cooperative, no acute distress  Skin:  Extremities and face reveal no rashes. No bansal erythema. No telangiectasias on the chest wall. HEENT: Sclerae anicteric. Extra-occular muscles are intact. No oral ulcers. No abnormal pigmentation of the lips. The neck is supple. Cardiovascular: Regular rate and rhythm. No murmurs, gallops, or rubs. Respiratory:  Comfortable breathing with no accessory muscle use. Clear breath sounds anteriorly with no wheezes, rales, or rhonchi. GI:  Abdomen nondistended, soft, and nontender. Normal active bowel sounds. No enlargement of the liver or spleen. No masses palpable. Rectal:  Deferred  Musculoskeletal:   No costovertebral tenderness.   No localized muscle weakness, or decreased range of motion. Extremities:  No palpable cords or pitting edema of the lower legs. Extremities have good range of motion. Neurological:  Gross memory appears intact. Patient is alert and oriented. Psychiatric:  Mood appears appropriate with judgement intact. Lymphatic:  No cervical or supraclavicular adenopathy. Laboratory:   No results          CT scan of abdomen and pelvis - No results  CT Results (most recent):  Results from Hospital Encounter encounter on 08/11/21    CT HEAD W WO CONT    Narrative  EXAM: CT head    INDICATION: Headache. COMPARISON: None. TECHNIQUE: Axial CT imaging of the head was performed prior to and following  intravenous contrast. Standard multiplanar coronal and sagittal reformatted  images were obtained and are included in interpretation. One or more dose reduction techniques were used on this CT: automated exposure  control, adjustment of the mAs and/or kVp according to patient size, and  iterative reconstruction techniques. The specific techniques used on this CT  exam have been documented in the patient's electronic medical record. Digital  Imaging and Communications in Medicine (DICOM) format image data are available  to nonaffiliated external healthcare facilities or entities on a secure, media  free, reciprocally searchable basis with patient authorization for at least a  12-month period after this study. _______________    FINDINGS:    BRAIN AND POSTERIOR FOSSA: No abnormal focus of enhancement. No evidence of  acute large vessel transcortical infarct or acute parenchymal hemorrhage. No  midline shift or hydrocephalus. EXTRA-AXIAL SPACES AND MENINGES: There are no  abnormal extra-axial fluid collections. CALVARIUM: Intact. SINUSES: Clear. OTHER: None.    _______________    Impression  No acute intracranial abnormality. Abdominal US- No results  US Results (most recent):  No results found for this or any previous visit.            MRI and MRCP- No results  MRI Results (most recent):  No results found for this or any previous visit. Assessment:     Complains of reflux. The patient may have esophageal reflux. However I do not feel this is causing her symptoms as discussed below. This should be adequately controlled with omeprazole 40 mg a day. Complaints of dysphagia and air being cut off when she turns her head. It is very clear to me that this is due to her cervical disc disease. She has had surgery on her neck and is had multiple studies including several x-rays and upper endoscopy and barium swallows. Also some type of anterior osteophyte formation with compression into her esophagus. Also, scar tissue  from her neck surgery is also a factor. This is almost certainly causing her to have difficulty with swallowing as well as passage of air, and her complaint of food pulling when she eats and coughing when eating. Unfortunately this problem is not fixable from a GI standpoint. Depression. The patient is on oral medication for this problem. Chronic pain. The patient is on medication for this problem. Hypothyroidism. Patient is on thyroid replacement. Abdominal pain. I suspect this is due to adhesions from surgeries probably aggravated by her constipation. History of colon polyps. She is not sure when she is due, which would be 5 years from her last colonoscopy. Constipation. Chronic problem and I feel this should be treated with an agent on a regular basis. Plan:     Observation and reassurance. Unfortunately, there is nothing further to do to assist this patient from a GI standpoint as this is not due to her GI tract or any GI pathology present. This has been longstanding unfortunately she will just have to to live with this problem or have surgery to correct it. Start Natural Calm 1 tablespoon,1-2x a day for constipation, adjust to effect.   She will continue omeprazole 40 mg po daily for her reflux, which is not causing her symptoms. She will check on when she is due for her surveillance colonoscopy and let us know and we can schedule the procedure. The patient will follow up with me as needed.       Ximena Patel MD

## 2022-10-11 ENCOUNTER — TELEPHONE (OUTPATIENT)
Dept: PULMONOLOGY | Age: 67
End: 2022-10-11

## 2022-10-11 NOTE — TELEPHONE ENCOUNTER
Patient was a patient of Dr. Ludivina Nunez. Last seen 2019. She needed date of Barium swallow study he ordered for her new doctor.  6/3/2016 at [facility] .

## 2023-01-11 ENCOUNTER — OFFICE VISIT (OUTPATIENT)
Dept: ORTHOPEDIC SURGERY | Age: 68
End: 2023-01-11
Payer: MEDICARE

## 2023-01-11 DIAGNOSIS — M17.11 OSTEOARTHRITIS OF RIGHT KNEE, UNSPECIFIED OSTEOARTHRITIS TYPE: ICD-10-CM

## 2023-01-11 DIAGNOSIS — M25.561 RIGHT KNEE PAIN, UNSPECIFIED CHRONICITY: Primary | ICD-10-CM

## 2023-01-11 PROBLEM — K29.70 GASTRITIS: Status: ACTIVE | Noted: 2023-01-11

## 2023-01-11 PROBLEM — E28.39 DECREASED ESTROGEN LEVEL: Status: ACTIVE | Noted: 2023-01-11

## 2023-01-11 PROBLEM — R42 VERTIGO: Status: ACTIVE | Noted: 2023-01-11

## 2023-01-11 PROBLEM — E88.09 HYPERPROTEINEMIA: Status: ACTIVE | Noted: 2023-01-11

## 2023-01-11 PROBLEM — J44.9 CHRONIC OBSTRUCTIVE PULMONARY DISEASE (HCC): Status: ACTIVE | Noted: 2023-01-11

## 2023-01-11 PROBLEM — G89.4 CHRONIC PAIN DISORDER: Status: ACTIVE | Noted: 2023-01-11

## 2023-01-11 PROBLEM — K59.00 CONSTIPATION: Status: ACTIVE | Noted: 2023-01-11

## 2023-01-11 PROBLEM — E03.9 HYPOTHYROID: Status: ACTIVE | Noted: 2023-01-11

## 2023-01-11 PROBLEM — I10 PRIMARY HYPERTENSION: Status: ACTIVE | Noted: 2023-01-11

## 2023-01-11 PROBLEM — S62.109A CLOSED FRACTURE OF CARPAL BONE: Status: ACTIVE | Noted: 2023-01-11

## 2023-01-11 PROBLEM — M19.90 OSTEOARTHROSIS: Status: ACTIVE | Noted: 2023-01-11

## 2023-01-11 PROBLEM — K57.30 DIVERTICULAR DISEASE OF COLON: Status: ACTIVE | Noted: 2023-01-11

## 2023-01-11 PROBLEM — E78.9 DISORDER OF LIPID METABOLISM: Status: ACTIVE | Noted: 2023-01-11

## 2023-01-11 PROBLEM — R13.10 DYSPHAGIA: Status: ACTIVE | Noted: 2023-01-11

## 2023-01-11 PROBLEM — F41.9 ANXIETY: Status: ACTIVE | Noted: 2023-01-11

## 2023-01-11 PROBLEM — N18.31 STAGE 3A CHRONIC KIDNEY DISEASE (HCC): Status: ACTIVE | Noted: 2023-01-11

## 2023-01-11 RX ORDER — PREDNISONE 20 MG/1
20 TABLET ORAL DAILY
COMMUNITY
Start: 2022-10-17

## 2023-01-11 RX ORDER — MELOXICAM 7.5 MG/1
7.5 TABLET ORAL DAILY
COMMUNITY
Start: 2022-10-27

## 2023-01-11 RX ORDER — LIDOCAINE HYDROCHLORIDE 10 MG/ML
9 INJECTION INFILTRATION; PERINEURAL ONCE
Status: COMPLETED | OUTPATIENT
Start: 2023-01-11 | End: 2023-01-11

## 2023-01-11 RX ORDER — TRIAMCINOLONE ACETONIDE 40 MG/ML
40 INJECTION, SUSPENSION INTRA-ARTICULAR; INTRAMUSCULAR ONCE
Status: COMPLETED | OUTPATIENT
Start: 2023-01-11 | End: 2023-01-11

## 2023-01-11 RX ORDER — ALBUTEROL SULFATE 90 UG/1
AEROSOL, METERED RESPIRATORY (INHALATION)
COMMUNITY

## 2023-01-11 RX ORDER — PROPRANOLOL HYDROCHLORIDE 20 MG/1
TABLET ORAL
COMMUNITY
Start: 2023-01-06

## 2023-01-11 RX ORDER — FLUTICASONE PROPIONATE AND SALMETEROL 100; 50 UG/1; UG/1
POWDER RESPIRATORY (INHALATION)
COMMUNITY

## 2023-01-11 RX ORDER — FLUTICASONE PROPIONATE AND SALMETEROL 500; 50 UG/1; UG/1
POWDER RESPIRATORY (INHALATION)
COMMUNITY
Start: 2022-07-25

## 2023-01-11 RX ORDER — BENRALIZUMAB 30 MG/ML
INJECTION, SOLUTION SUBCUTANEOUS
COMMUNITY
Start: 2022-10-05

## 2023-01-11 RX ADMIN — LIDOCAINE HYDROCHLORIDE 9 ML: 10 INJECTION INFILTRATION; PERINEURAL at 10:00

## 2023-01-11 RX ADMIN — TRIAMCINOLONE ACETONIDE 40 MG: 40 INJECTION, SUSPENSION INTRA-ARTICULAR; INTRAMUSCULAR at 10:00

## 2023-01-11 NOTE — LETTER
1/12/2023    Patient: Charles Enamorado   YOB: 1955   Date of Visit: 1/11/2023     Mary Tabor NP  [facility] [address]  Via Fax: [phone]    Dear Mary Tabor NP,      Thank you for referring Ms. Chandrika Flannery to [address] [place] [address] for evaluation. My notes for this consultation are attached. If you have questions, please do not hesitate to call me. I look forward to following your patient along with you.       Sincerely,    David Mackenzie MD

## 2023-01-11 NOTE — LETTER
Chandrika Rodriguez Shanks   1955   [id number]       1/11/2023       I hereby authorize and direct Jaden Rod MD, [place] Julian, and whomever he may designate as his associate to perform upon myself the following procedure:    Injection of: Kenalog, Supartz, Euflexxa, Orthovisc in the Right/Left ____________________. If any unforeseen condition arises in the course of the procedure, I further authorize him and his associated and/or assistant(s) to do whatever he/she deems advisable. The nature, purpose, benefits, risks, side effects, likelihood of achieving goals, and potential problems that might occur during recuperation, risks for not receiving the proposed care, treatment and services and alternatives of the procedure have been fully explained to me by my physician including, but not limited to:    Swelling, joint pain, skin pigment changes, worsening of condition, and failure to improve. I acknowledge that no guarantee or assurance has been made to me as to the results that may be obtained or the likelihood of success.                 _______________________________________     Signature of patient or authorized representative                [employer] [facility] fax: [phone]     Room 2

## 2023-01-11 NOTE — PATIENT INSTRUCTIONS

## 2023-01-11 NOTE — PROGRESS NOTES
Name: Rica Bullard    : 1955     Service Dept: [address] [place] [address]    Chief Complaint   Patient presents with    Knee Pain        There were no vitals taken for this visit. Allergies   Allergen Reactions    Sulfa (Sulfonamide Antibiotics) Unknown (comments)     Other reaction(s): hives    Codeine Other (comments)    Duloxetine Unknown (comments)     Other reaction(s): unknow    Gabapentin Unknown (comments)     Other reaction(s): unknown    Metaxalone Unknown (comments)     Other reaction(s): unknown    Reserpine Unknown (comments)     Other reaction(s): hives/ rash    Sertraline Unknown (comments)     Other reaction(s): muscle cramps        Current Outpatient Medications   Medication Sig Dispense Refill    fluticasone propion-salmeteroL (Advair Diskus) 500-50 mcg/dose diskus inhaler 1 INH      glycopyrrolate-formoteroL (Bevespi Aerosphere) 9-4.8 mcg HFA inhaler 2 puff(s)      albuterol (ProAir HFA) 90 mcg/actuation inhaler USE 2 INHALATIONS BY MOUTH  4 TO 6 TIMES DAILY INHALED      Fasenra Pen 30 mg/mL atIn       fluticasone propion-salmeteroL (Wixela Inhub) 100-50 mcg/dose diskus inhaler 1 INH      meloxicam (MOBIC) 7.5 mg tablet Take 7.5 mg by mouth daily. predniSONE (DELTASONE) 20 mg tablet Take 20 mg by mouth daily. propranoloL (INDERAL) 20 mg tablet       tiotropium bromide (Spiriva Respimat) 1.25 mcg/actuation inhaler 2 puff(s)      omeprazole (PRILOSEC) 40 mg capsule       ALPRAZolam (XANAX) 0.25 mg tablet       ezetimibe (ZETIA) 10 mg tablet       levothyroxine (SYNTHROID) 50 mcg tablet Take  by mouth Daily (before breakfast). pantoprazole (PROTONIX) 40 mg tablet Take 40 mg by mouth daily. FLUOXETINE HCL (FLUOXETINE PO) Take  by mouth. traMADol (ULTRAM) 50 mg tablet Take 50 mg by mouth every six (6) hours as needed for Pain. busPIRone (BUSPAR) 15 mg tablet Take 15 mg by mouth three (3) times daily.       fluticasone (FLONASE) 50 mcg/actuation nasal spray 2 Sprays by Both Nostrils route once. bisacodyl (DULCOLAX) 5 mg EC tablet Take 5 mg by mouth daily as needed for Constipation. meclizine (ANTIVERT) 25 mg tablet Take  by mouth three (3) times daily as needed. magnesium oxide 250 mg tablet Take 250 mg by mouth daily. topiramate (TOPAMAX) 25 mg tablet Take  by mouth two (2) times a day. montelukast (SINGULAIR) 10 mg tablet Take 10 mg by mouth daily. acetaminophen (TYLENOL) 500 mg tablet Take  by mouth every six (6) hours as needed for Pain. albuterol-ipratropium (DUO-NEB) 2.5 mg-0.5 mg/3 ml nebu 3 mL by Nebulization route.        Current Facility-Administered Medications   Medication Dose Route Frequency Provider Last Rate Last Admin    [COMPLETED] lidocaine (XYLOCAINE) 10 mg/mL (1 %) injection 9 mL  9 mL Other ONCE Jaden Del Toro MD   9 mL at 01/11/23 1000    [COMPLETED] triamcinolone acetonide (KENALOG-40) 40 mg/mL injection 40 mg  40 mg Intra artICUlar ONCE Burnice Agustina PERRY MD   40 mg at 01/11/23 1000      Patient Active Problem List   Diagnosis Code    Cough R05.9    Congestion of respiratory tract J98.8    Closed fracture of carpal bone S62.109A    Chronic pain disorder G89.4    Chronic obstructive pulmonary disease (HCC) J44.9    Chest pain R07.9    Anxiety F41.9    Allergic rhinitis J30.9    Allergic reaction T78.40XA    Diverticular disease of colon K57.30    Disorder of lipid metabolism E78.9    Decreased estrogen level E28.39    Constipation K59.00    Mixed anxiety depressive disorder F41.8    Gastritis K29.70    Dysphagia R13.10    DJD (degenerative joint disease) of cervical spine M47.812    Hypothyroid E03.9    Hyperproteinemia E88.09    Hypercholesterolemia E78.00    Primary hypertension I10    Post menopausal syndrome N95.1    Osteoarthrosis M19.90    Migraine G43.909    Internal hemorrhoids K64.8    Weight gain R63.5    Vertigo R42    Stage 3a chronic kidney disease (HCC) N18.31 Family History   Problem Relation Age of Onset    Cancer Mother     Lung Disease Mother     OSTEOARTHRITIS Mother     Cancer Father     Lung Disease Father     Diabetes Father     Hypertension Father     Heart Disease Father     OSTEOARTHRITIS Father       Social History     Socioeconomic History    Marital status:    Tobacco Use    Smoking status: Never    Smokeless tobacco: Never   Substance and Sexual Activity    Alcohol use: Never     Alcohol/week: 0.0 standard drinks    Drug use: Never      Past Surgical History:   Procedure Laterality Date    HX APPENDECTOMY      HX CARPAL TUNNEL RELEASE Bilateral     HX CERVICAL FUSION      HX COLONOSCOPY      HX COLONOSCOPY      HX TOTAL ABDOMINAL HYSTERECTOMY        Past Medical History:   Diagnosis Date    Allergic rhinitis     Anxiety     Arthritis     Asthma     Bilateral knee pain     Chronic lung disease     Chronic obstructive airway disease (HCC)     Constipation     COPD (chronic obstructive pulmonary disease) (HCC)     Depression     Diverticular disease     Fatigue     GERD (gastroesophageal reflux disease)     Hypothyroidism     Left rotator cuff tear     Migraines     Osteoarthritis     Sinusitis     SOB (shortness of breath)     Vertigo         I have reviewed and agree with PFSH and ROS and intake form in chart and the record furthermore I have reviewed prior medical record(s) regarding this patients care during this appointment. Review of Systems:   Patient is a pleasant appearing individual, appropriately dressed, well hydrated, well nourished, who is alert, appropriately oriented for age, and in no acute distress with a normal gait and normal affect who does not appear to be in any significant pain.     Physical Exam:  Right Knee -Decrease range of motion with flexion, Knee arc of greater than 50 degrees, Some crepitation, Grossly neurovascularly intact, Good cap refill, No skin lesion, Moderate swelling, No gross instability, Some quadriceps weakness, greater than 50 degree arc    Left Knee - Full Range of Motion, No crepitation, Grossly neurovascularly intact, Good cap refill, No skin lesion, No swelling, No gross instability, No quadriceps weakness     Procedure Documentation:    I discussed in detail the risks, benefits and complications of an injection which included but are not limited to infection, skin reactions, hot swollen joint, and anaphylaxis with the patient. The patient verbalized understanding and gave informed consent for the injection. The patient's knee was flexed to 90° and the skin prepped using sterile alcohol solution. A sterile needle was inserted into the right knee and the mixture of 9 mL Lidocaine 1%, 1 mL Kenalog 40 mg was injected under sterile technique. The needle was withdrawn and the puncture site sealed with a Band-Aid. Technique: Under sterile conditions a [employer] ultrasound unit with a variable frequency (7.0-14.0 MHz) linear transducer was used to localize the placement of needle into the right knee joint. Findings: Successful needle placement for knee injection. Final images were taken and saved for permanent record. The patient tolerated the injection well. The patient was instructed to call the office immediately if there is any pain, redness, warmth, fever, or chills. Encounter Diagnoses     ICD-10-CM ICD-9-CM   1. Right knee pain, unspecified chronicity  M25.561 719.46   2. Osteoarthritis of right knee, unspecified osteoarthritis type  M17.11 715.96       HPI:  The patient is here with right knee pain, throbbing burning pain, progressively getting worse. Pain is 8/10. Continues to have difficulty with it. X-rays of the right knee are positive for OA, moderate in nature. Assessment/Plan:  Plan will be for cortisone injection today. She has not had the cortisone injection in her right knee recently, and so she wants to try that. Voltaren gel and antiinflammatories. We will go from there.   If the patient gets worse, she is to give me a call. No restrictions in the meantime. As part of continued conservative pain management options the patient was advised to utilize Tylenol or OTC NSAIDS as long as it is not medically contraindicated. Return to Office: Follow-up and Dispositions    Return if symptoms worsen or fail to improve. Administrations This Visit       lidocaine (XYLOCAINE) 10 mg/mL (1 %) injection 9 mL       Admin Date  01/11/2023 Action  Given Dose  9 mL Route  Other Administered By  Chata Francisco LPN              triamcinolone acetonide (KENALOG-40) 40 mg/mL injection 40 mg       Admin Date  01/11/2023 Action  Given Dose  40 mg Route  Intra artICUlar Administered By  Chata Francisco LPN                   Scribed by Ebony Mcdaniel LPN as dictated by [facility] ZAYRA Astorga MD.  Documentation, performed by, True and Accepted Jaden Astorga MD

## 2023-08-03 ENCOUNTER — OFFICE VISIT (OUTPATIENT)
Age: 68
End: 2023-08-03

## 2023-08-03 VITALS — BODY MASS INDEX: 29.88 KG/M2 | HEIGHT: 64 IN | WEIGHT: 175 LBS

## 2023-08-03 DIAGNOSIS — M17.0 OSTEOARTHRITIS OF BOTH KNEES, UNSPECIFIED OSTEOARTHRITIS TYPE: ICD-10-CM

## 2023-08-03 DIAGNOSIS — M25.562 PAIN IN BOTH KNEES, UNSPECIFIED CHRONICITY: Primary | ICD-10-CM

## 2023-08-03 DIAGNOSIS — M25.561 PAIN IN BOTH KNEES, UNSPECIFIED CHRONICITY: Primary | ICD-10-CM

## 2023-08-03 RX ORDER — LIDOCAINE HYDROCHLORIDE 10 MG/ML
9 INJECTION, SOLUTION INFILTRATION; PERINEURAL ONCE
Status: COMPLETED | OUTPATIENT
Start: 2023-08-03 | End: 2023-08-03

## 2023-08-03 RX ORDER — TRIAMCINOLONE ACETONIDE 40 MG/ML
40 INJECTION, SUSPENSION INTRA-ARTICULAR; INTRAMUSCULAR ONCE
Status: COMPLETED | OUTPATIENT
Start: 2023-08-03 | End: 2023-08-03

## 2023-08-03 RX ADMIN — LIDOCAINE HYDROCHLORIDE 9 ML: 10 INJECTION, SOLUTION INFILTRATION; PERINEURAL at 10:43

## 2023-08-03 RX ADMIN — TRIAMCINOLONE ACETONIDE 40 MG: 40 INJECTION, SUSPENSION INTRA-ARTICULAR; INTRAMUSCULAR at 10:42

## 2023-08-03 RX ADMIN — TRIAMCINOLONE ACETONIDE 40 MG: 40 INJECTION, SUSPENSION INTRA-ARTICULAR; INTRAMUSCULAR at 10:43

## 2023-08-03 NOTE — PROGRESS NOTES
DJD (degenerative joint disease) of cervical spine    Hypothyroid    Hyperproteinemia    Hypercholesterolemia    Primary hypertension    Post menopausal syndrome    Osteoarthrosis    Migraine    Internal hemorrhoids    Weight gain    Vertigo    Stage 3a chronic kidney disease ([address])      Family History   Problem Relation Age of Onset    Hypertension Father     Heart Disease Father     Osteoarthritis Father     Cancer Mother     Lung Disease Mother     Osteoarthritis Mother     Cancer Father     Lung Disease Father     Diabetes Father       Social History     Socioeconomic History    Marital status:      Spouse name: None    Number of children: None    Years of education: None    Highest education level: None   Tobacco Use    Smoking status: Never    Smokeless tobacco: Never   Substance and Sexual Activity    Alcohol use: Never     Alcohol/week: 0.0 standard drinks    Drug use: Never      Past Surgical History:   Procedure Laterality Date    APPENDECTOMY      CARPAL TUNNEL RELEASE Bilateral     CERVICAL FUSION      COLONOSCOPY      COLONOSCOPY      HYSTERECTOMY, TOTAL ABDOMINAL (CERVIX REMOVED)        Past Medical History:   Diagnosis Date    Allergic rhinitis     Anxiety     Arthritis     Asthma     Bilateral knee pain     Chronic lung disease     Chronic obstructive airway disease (HCC)     Constipation     COPD (chronic obstructive pulmonary disease) (HCC)     Depression     Diverticular disease     Fatigue     GERD (gastroesophageal reflux disease)     Hypothyroidism     Left rotator cuff tear     Migraines     Osteoarthritis     Sinusitis     SOB (shortness of breath)     Stroke (HCC)     Vertigo         I have reviewed and agree with [address] and ROS and intake form in chart and the record furthermore I have reviewed prior medical record(s) regarding this patients care during this appointment.      Review of Systems:   Patient is a pleasant appearing individual, appropriately dressed, well hydrated, well

## 2024-05-22 ENCOUNTER — TELEPHONE (OUTPATIENT)
Age: 69
End: 2024-05-22

## 2024-05-22 NOTE — TELEPHONE ENCOUNTER
Referral for screening colonoscopy. Please review and advise.      Referral  Referral # [id number]  Referral Information    Referral # Creation Date Referral Status Status Update    [id number] 05/16/2024 In Progress 05/22/2024: Status History     Status Reason Referral Type Referral Reasons Referral Class   none Eval and Treat none Incoming     To Specialty To Provider To Location/Place of Service To Department   none Jorge Dobson MD none none     To Vendor Referred By By Location/Place of Service By Department   none Rhina Porras APRN - NP none none     Priority Start Date Expiration Date Referral Entered By   Routine 05/09/2024 05/09/2025 Santa Diaz LPN     Visits Requested Visits Authorized Visits Completed Visits Scheduled   2 2       Coverages    Payor Plan Auth. Required? Covered? Member # Authorized From Expires Auth # Precert. # Comment   [employer] -- Covered [id number] 5/1/2023 -- -- -- --     Referral Information    Referral # Creation Date Referral Status Status Update    [id number] 05/16/2024 In Progress 05/22/2024: Status History     Status Reason Referral Type Referral Reasons Referral Class   none Eval and Treat none Incoming     To Specialty To Provider To Location/Place of Service To Department   none Jorge Dobson MD none none     To Vendor Referred By By Location/Place of Service By Department   none Rhina Porras APRN - NP none none     Priority Start Date Expiration Date Referral Entered By   Routine 05/09/2024 05/09/2025 Santa Diaz LPN     Visits Requested Visits Authorized Visits Completed Visits Scheduled   2 2       Procedure Information    Service Details  Procedure Modifiers Provider Requested Approved   REF25 - AMB REFERRAL TO GASTROENTEROLOGY none Jorge Dobson MD 2 2     Diagnosis Information    Diagnosis   Z12.11 (ICD-10-CM) - Encounter for screening for malignant neoplasm of colon     Service Level

## 2024-07-10 ENCOUNTER — SCHEDULED TELEPHONE ENCOUNTER (OUTPATIENT)
Age: 69
End: 2024-07-10

## 2024-07-10 ENCOUNTER — PREP FOR PROCEDURE (OUTPATIENT)
Age: 69
End: 2024-07-10

## 2024-07-10 ENCOUNTER — TELEPHONE (OUTPATIENT)
Age: 69
End: 2024-07-10

## 2024-07-10 DIAGNOSIS — Z86.010 HISTORY OF COLON POLYPS: Primary | ICD-10-CM

## 2024-07-10 DIAGNOSIS — Z12.11 ENCOUNTER FOR SCREENING COLONOSCOPY: ICD-10-CM

## 2024-07-10 NOTE — TELEPHONE ENCOUNTER
Patient is scheduled for colonoscopy on 8/7/24. She called the office today to ask if someone can bring her on the scheduled day and drop her off. Her daughter will pick her up after she gets off work and will arrive for  no later than 4pm. I called and spoke with Diya Rogel regarding this and she was in agreement. I have called the patient back to let her know so she can proceed with travel arrangements for the procedure date.    Also, this patient will be using the SuTab prep which is in her Media tab. I am also emailing it to PAT staff so they will have it. SuTab samples and prep will be picked up by the patient tomorrow at the office.    Patient called office back and stated that her friend could bring her and stay with her to take her home if she is scheduled for a morning appt. I advised her of the above conversation. I told her that once she gets her travel arrangements straight to let the OR staff know that information when they call her to discuss the prep and give her an arrival time.

## 2024-07-11 NOTE — TELEPHONE ENCOUNTER
Talked with patient again this morning. She said if she can be the first appt or 2nd of the morning, then her friend can bring her and stay to take her home. Please change her time to earlier if possible on 8/7/24. Please see previous message regarding this patient. Any questions, please call me.

## 2024-07-17 RX ORDER — MELOXICAM 15 MG/1
15 TABLET ORAL DAILY
COMMUNITY

## 2024-07-17 RX ORDER — AZELASTINE 1 MG/ML
1 SPRAY, METERED NASAL 2 TIMES DAILY
COMMUNITY

## 2024-07-17 RX ORDER — EZETIMIBE 10 MG/1
10 TABLET ORAL DAILY
COMMUNITY

## 2024-07-23 ENCOUNTER — ANESTHESIA EVENT (OUTPATIENT)
Age: 69
End: 2024-07-23
Payer: MEDICARE

## 2024-08-02 RX ORDER — SODIUM CHLORIDE, SODIUM LACTATE, POTASSIUM CHLORIDE, CALCIUM CHLORIDE 600; 310; 30; 20 MG/100ML; MG/100ML; MG/100ML; MG/100ML
INJECTION, SOLUTION INTRAVENOUS CONTINUOUS
Status: CANCELLED | OUTPATIENT
Start: 2024-08-02

## 2024-08-02 NOTE — H&P
edema.  Neuro: No distinct abnormalities.    Impression:  1.  Colon cancer screening.  The patient is a healthy female that is stable and here for colon cancer screening via colonoscopy.      Recommendations:  1.  Colonoscopy with MAC.  The risks, benefits, adverse reactions including death and the possibility of missed lesions were neoplasia were discussed in detail today with the patient prior to the procedure.  They understand and agreed to undergo the procedure.  2.  Further recommendations pending their clinical course.  3.  Followup as needed.

## 2024-08-07 ENCOUNTER — HOSPITAL ENCOUNTER (OUTPATIENT)
Age: 69
Setting detail: OUTPATIENT SURGERY
Discharge: HOME | End: 2024-08-07
Attending: INTERNAL MEDICINE | Admitting: INTERNAL MEDICINE
Payer: MEDICARE

## 2024-08-07 ENCOUNTER — ANESTHESIA (OUTPATIENT)
Age: 69
End: 2024-08-07
Payer: MEDICARE

## 2024-08-07 VITALS
RESPIRATION RATE: 16 BRPM | SYSTOLIC BLOOD PRESSURE: 125 MMHG | HEART RATE: 55 BPM | WEIGHT: 175 LBS | HEIGHT: 64 IN | BODY MASS INDEX: 29.88 KG/M2 | OXYGEN SATURATION: 96 % | DIASTOLIC BLOOD PRESSURE: 70 MMHG | TEMPERATURE: 97 F

## 2024-08-07 DIAGNOSIS — Z86.010 HISTORY OF COLON POLYPS: ICD-10-CM

## 2024-08-07 PROCEDURE — 3600007512: Performed by: INTERNAL MEDICINE

## 2024-08-07 PROCEDURE — 3600007502: Performed by: INTERNAL MEDICINE

## 2024-08-07 PROCEDURE — 88305 TISSUE EXAM BY PATHOLOGIST: CPT

## 2024-08-07 PROCEDURE — 2709999900 HC NON-CHARGEABLE SUPPLY: Performed by: INTERNAL MEDICINE

## 2024-08-07 PROCEDURE — 3700000000 HC ANESTHESIA ATTENDED CARE: Performed by: INTERNAL MEDICINE

## 2024-08-07 PROCEDURE — 7100000010 HC PHASE II RECOVERY - FIRST 15 MIN: Performed by: INTERNAL MEDICINE

## 2024-08-07 PROCEDURE — 2580000003 HC RX 258: Performed by: INTERNAL MEDICINE

## 2024-08-07 PROCEDURE — 3700000001 HC ADD 15 MINUTES (ANESTHESIA): Performed by: INTERNAL MEDICINE

## 2024-08-07 PROCEDURE — 7100000011 HC PHASE II RECOVERY - ADDTL 15 MIN: Performed by: INTERNAL MEDICINE

## 2024-08-07 PROCEDURE — 6360000002 HC RX W HCPCS: Performed by: NURSE ANESTHETIST, CERTIFIED REGISTERED

## 2024-08-07 RX ORDER — PROPOFOL 10 MG/ML
INJECTION, EMULSION INTRAVENOUS PRN
Status: DISCONTINUED | OUTPATIENT
Start: 2024-08-07 | End: 2024-08-07 | Stop reason: SDUPTHER

## 2024-08-07 RX ORDER — SODIUM CHLORIDE 0.9 % (FLUSH) 0.9 %
5-40 SYRINGE (ML) INJECTION EVERY 12 HOURS SCHEDULED
Status: CANCELLED | OUTPATIENT
Start: 2024-08-07

## 2024-08-07 RX ORDER — SODIUM CHLORIDE, SODIUM LACTATE, POTASSIUM CHLORIDE, CALCIUM CHLORIDE 600; 310; 30; 20 MG/100ML; MG/100ML; MG/100ML; MG/100ML
INJECTION, SOLUTION INTRAVENOUS CONTINUOUS
Status: DISCONTINUED | OUTPATIENT
Start: 2024-08-07 | End: 2024-08-07 | Stop reason: HOSPADM

## 2024-08-07 RX ORDER — SODIUM CHLORIDE 9 MG/ML
INJECTION, SOLUTION INTRAVENOUS PRN
Status: DISCONTINUED | OUTPATIENT
Start: 2024-08-07 | End: 2024-08-07 | Stop reason: HOSPADM

## 2024-08-07 RX ORDER — SODIUM CHLORIDE 0.9 % (FLUSH) 0.9 %
5-40 SYRINGE (ML) INJECTION EVERY 12 HOURS SCHEDULED
Status: DISCONTINUED | OUTPATIENT
Start: 2024-08-07 | End: 2024-08-07 | Stop reason: HOSPADM

## 2024-08-07 RX ORDER — NALOXONE HYDROCHLORIDE 0.4 MG/ML
INJECTION, SOLUTION INTRAMUSCULAR; INTRAVENOUS; SUBCUTANEOUS PRN
Status: CANCELLED | OUTPATIENT
Start: 2024-08-07

## 2024-08-07 RX ORDER — SODIUM CHLORIDE 0.9 % (FLUSH) 0.9 %
5-40 SYRINGE (ML) INJECTION PRN
Status: DISCONTINUED | OUTPATIENT
Start: 2024-08-07 | End: 2024-08-07 | Stop reason: HOSPADM

## 2024-08-07 RX ORDER — SODIUM CHLORIDE, SODIUM LACTATE, POTASSIUM CHLORIDE, CALCIUM CHLORIDE 600; 310; 30; 20 MG/100ML; MG/100ML; MG/100ML; MG/100ML
INJECTION, SOLUTION INTRAVENOUS CONTINUOUS
Status: CANCELLED | OUTPATIENT
Start: 2024-08-07

## 2024-08-07 RX ADMIN — PROPOFOL 50 MG: 10 INJECTION, EMULSION INTRAVENOUS at 09:31

## 2024-08-07 RX ADMIN — PROPOFOL 100 MG: 10 INJECTION, EMULSION INTRAVENOUS at 09:37

## 2024-08-07 RX ADMIN — PROPOFOL 100 MG: 10 INJECTION, EMULSION INTRAVENOUS at 09:25

## 2024-08-07 RX ADMIN — SODIUM CHLORIDE, POTASSIUM CHLORIDE, SODIUM LACTATE AND CALCIUM CHLORIDE: 600; 310; 30; 20 INJECTION, SOLUTION INTRAVENOUS at 09:15

## 2024-08-07 RX ADMIN — SODIUM CHLORIDE, POTASSIUM CHLORIDE, SODIUM LACTATE AND CALCIUM CHLORIDE: 600; 310; 30; 20 INJECTION, SOLUTION INTRAVENOUS at 08:21

## 2024-08-07 ASSESSMENT — PAIN - FUNCTIONAL ASSESSMENT
PAIN_FUNCTIONAL_ASSESSMENT: ADULT NONVERBAL PAIN SCALE (NPVS)
PAIN_FUNCTIONAL_ASSESSMENT: 0-10
PAIN_FUNCTIONAL_ASSESSMENT: NONE - DENIES PAIN
PAIN_FUNCTIONAL_ASSESSMENT: NONE - DENIES PAIN

## 2024-08-07 ASSESSMENT — ENCOUNTER SYMPTOMS: SHORTNESS OF BREATH: 1

## 2024-08-07 NOTE — ANESTHESIA PRE PROCEDURE
Department of Anesthesiology  Preprocedure Note       Name:  Cinthia Elias   Age:  69 y.o.  :  1955                                          MRN:  [id number]         Date:  2024      Surgeon: Surgeon(s):  Jorge Dobson MD    Procedure: Procedure(s):  Colonoscopy    Medications prior to admission:   Prior to Admission medications    Medication Sig Start Date End Date Taking? Authorizing Provider   meloxicam (MOBIC) 15 MG tablet Take 1 tablet by mouth daily   Yes Provider, MD Otis   azelastine (ASTELIN) 0.1 % nasal spray 1 spray by Nasal route 2 times daily Use in each nostril as directed   Yes Provider, MD Otis   ezetimibe (ZETIA) 10 MG tablet Take 1 tablet by mouth daily   Yes Provider, MD Otis   albuterol (PROVENTIL) (2.5 MG/3ML) 0.083% nebulizer solution  16   Provider, MD Otis   propranolol (INDERAL) 20 MG tablet  3/28/23   Provider, MD Otis   ALPRAZolam (XANAX) 0.25 MG tablet ceived the following from [facility]: Outside name: ALPRAZolam (XANAX) 0.25 mg tablet 22   Automatic Reconciliation, Ar   bisacodyl (DULCOLAX) 5 MG EC tablet Take 1 tablet by mouth daily as needed    Automatic Reconciliation, Ar   ipratropium-albuterol (DUONEB) 0.5-2.5 (3) MG/3ML SOLN nebulizer solution Inhale 3 mLs into the lungs    Automatic Reconciliation, Ar   levothyroxine (SYNTHROID) 50 MCG tablet Take by mouth every morning (before breakfast)    Automatic Reconciliation, Ar   meclizine (ANTIVERT) 25 MG tablet Take by mouth 3 times daily as needed  Patient not taking: Reported on 2023    Automatic Reconciliation, Ar   montelukast (SINGULAIR) 10 MG tablet Take 1 tablet by mouth daily    Automatic Reconciliation, Ar   omeprazole (PRILOSEC) 40 MG delayed release capsule ceived the following from [facility]: Outside name: omeprazole (PRILOSEC) 40 mg capsule 22   Automatic Reconciliation, Ar   pantoprazole (PROTONIX) 40 MG tablet Take 1

## 2024-08-07 NOTE — INTERVAL H&P NOTE
Update History & Physical    The patient's History and Physical of August 7, 2024 was reviewed with the patient and I examined the patient. There was no change. The surgical site was confirmed by the patient and me.     Plan: The risks, benefits, expected outcome, and alternative to the recommended procedure have been discussed with the patient. Patient understands and wants to proceed with the procedure.     Electronically signed by Jorge Dobson MD on 8/7/2024 at 9:13 AM

## 2024-08-07 NOTE — ANESTHESIA POSTPROCEDURE EVALUATION
Department of Anesthesiology  Postprocedure Note    Patient: Cinthia Elias  MRN: [id number]  YOB: 1955  Date of evaluation: 8/7/2024    Procedure Summary       Date: 08/07/24 Room / Location: [facility] ENDO 01 / [facility] ENDOSCOPY    Anesthesia Start: 0915 Anesthesia Stop: 0939    Procedure: Colonoscopy (Lower GI Region) Diagnosis:       Encounter for screening colonoscopy      History of colon polyps      (Encounter for screening colonoscopy [Z12.11])      (History of colon polyps [Z86.010])    Surgeons: Jorge Dobson MD Responsible Provider: Clyde Flanagan APRN - CRNA    Anesthesia Type: MAC ASA Status: 3            Anesthesia Type: MAC    Hung Phase I: Hung Score: 10    Hung Phase II:      Anesthesia Post Evaluation    Patient location during evaluation: bedside  Patient participation: complete - patient participated  Level of consciousness: awake and alert  Airway patency: patent  Nausea & Vomiting: no nausea and no vomiting  Cardiovascular status: hemodynamically stable and blood pressure returned to baseline  Respiratory status: acceptable and room air  Hydration status: euvolemic  Pain management: adequate    No notable events documented.

## 2024-08-07 NOTE — DISCHARGE INSTRUCTIONS
Impression:  Removal of 3 diminutive rectal polyps.  Sigmoid diverticulosis.  Grade 1 internal hemorrhoids.        Recommendations:  Check pathology.  Will notify patient with results when they are available.  Repeat colonoscopy in 5-10 years for surveillance versus screening.  Follow up as needed.  Start Miralax, can be taken 1-3 times a day as needed

## 2024-08-07 NOTE — OP NOTE
Colonoscopy procedure note    Date of service: 8/7/2024    Type: Surveillance    Indication for procedure: History of colon polyps    Anesthesia classification: ASA class 2    Patient history and physical been accomplished and documented.  Patient is assessed and determined to be appropriate candidate for planned procedure and sedation; patient reassessed immediately prior to sedation.      Sedation plan: MAC per anesthesia    Surgical assistant: Not applicable    Airway assessment: Range of motion: Normal, mouth opening, Visual obstruction: No.    UPDATED PREOP EXAM:  Unchanged.    VS: Reviewed  Gen: in NAD  CV: RRR, no murmur  Resp: CTA  Abd: Soft, NTND, +BS  Extrem: No cyanosis or edema  Neuro: Awake and alert    Informed consent obtained: Yes.  The indications, risks including but not limited to bleeding, perforation, infection, death, and potential failure to visual areas are diagnosed neoplasia, alternatives and benefits were discussed with the patient prior to the procedure.  Patient identity and procedure was verified, absent was obtained, and is consistent with the consent form found in the patient's records.    PROCEDURE PERFORMED:  COLONOSCOPY  to the cecum with MAC and forcep polypectomy of 3 diminutive rectal polyps.     INSTRUMENT: Olympus colonoscope per nursing notes.    FINDINGS:    External anal lesions: Normal   Rectum: normal except for 3 diminutive sessile polyps removed by forcep polypectomy without incident.  Rectal sphincter tone was normal.   Retroflexion view: Grade 1 internal hemorrhoids.  Sigmoid: normal except for diverticulosis.  Descending Colon: normal   Transverse Colon: normal   Ascending Colon: normal   Cecum: normal, including the appendiceal orifice and ileocecal valve.    Terminal ileum: not evaluated     Specimens: 1.  Personal polypectomy of 3 diminutive sessile rectal polyps.    Bowel preparation- adequate to detect small (5mm) polyps or larger.    Estimated blood loss: none

## 2024-08-09 PROBLEM — Z12.11 ENCOUNTER FOR SCREENING COLONOSCOPY: Status: RESOLVED | Noted: 2024-07-10 | Resolved: 2024-08-09

## 2024-08-12 ENCOUNTER — TELEPHONE (OUTPATIENT)
Age: 69
End: 2024-08-12

## 2024-08-12 NOTE — TELEPHONE ENCOUNTER
----- Message from Dr. Jorge Dobson MD sent at 8/10/2024  7:09 PM EDT -----  Colonoscopy pathology results: The polyps are hyperplastic in nature.  However, given her history of colon polyps, recommend repeat colonoscopy in 5 years time.  Please notify patient of the above.

## 2024-08-12 NOTE — TELEPHONE ENCOUNTER
----- Message from Dr. Jorge Dosbon MD sent at 8/10/2024  7:09 PM EDT -----  Colonoscopy pathology results: The polyps are hyperplastic in nature.  However, given her history of colon polyps, recommend repeat colonoscopy in 5 years time.  Please notify patient of the above.

## (undated) DEVICE — SOLUTION IRRIG 500ML STRL H2O NONPYROGENIC

## (undated) DEVICE — Device: Brand: DEFENDO VALVE AND CONNECTOR KIT

## (undated) DEVICE — SOLUTION IRRIG 1000ML STRL H2O USP PLAS POUR BTL

## (undated) DEVICE — TUBING, SUCTION, 9/32" X 10', STRAIGHT: Brand: MEDLINE

## (undated) DEVICE — GLOVE SURG SZ 65 L12IN FNGR THK79MIL GRN LTX FREE

## (undated) DEVICE — KIT COLON W/ 1.1OZ LUB AND 2 END

## (undated) DEVICE — FORCEPS BX L240CM JAW DIA2.4MM ORNG L CAP W/ NDL DISP RAD

## (undated) DEVICE — TUBING IRRIGATION BK FLO VLV FOR OFP ENDOSTAT ENDOGATOR DISP

## (undated) DEVICE — TUBING INSUFFLATION CAP W/ EXT CARBON DIOX ENDO SMARTCAP